# Patient Record
Sex: FEMALE | Race: WHITE | HISPANIC OR LATINO | URBAN - METROPOLITAN AREA
[De-identification: names, ages, dates, MRNs, and addresses within clinical notes are randomized per-mention and may not be internally consistent; named-entity substitution may affect disease eponyms.]

---

## 2020-09-11 ENCOUNTER — OFFICE VISIT (OUTPATIENT)
Dept: URBAN - METROPOLITAN AREA CLINIC 68 | Facility: CLINIC | Age: 50
End: 2020-09-11

## 2022-02-15 ENCOUNTER — OFFICE VISIT (OUTPATIENT)
Dept: URBAN - METROPOLITAN AREA CLINIC 68 | Facility: CLINIC | Age: 52
End: 2022-02-15

## 2022-06-04 ENCOUNTER — TELEPHONE ENCOUNTER (OUTPATIENT)
Dept: URBAN - METROPOLITAN AREA CLINIC 68 | Facility: CLINIC | Age: 52
End: 2022-06-04

## 2022-06-04 RX ORDER — AMOXICILLIN 500 MG/1
CAPSULE ORAL EVERY 12 HOURS
Qty: 56 | Refills: 0 | OUTPATIENT
Start: 2019-12-02 | End: 2019-12-16

## 2022-06-04 RX ORDER — CLARITHROMYCIN 500 MG/1
TABLET, FILM COATED ORAL EVERY 12 HOURS
Qty: 28 | Refills: 0 | OUTPATIENT
Start: 2019-12-02 | End: 2019-12-16

## 2022-06-04 RX ORDER — SODIUM SULFATE, POTASSIUM SULFATE, MAGNESIUM SULFATE 17.5; 3.13; 1.6 G/ML; G/ML; G/ML
SOLUTION, CONCENTRATE ORAL AS DIRECTED
Qty: 1 | Refills: 0 | OUTPATIENT
Start: 2019-10-23 | End: 2019-10-24

## 2022-06-04 RX ORDER — PANTOPRAZOLE SODIUM 40 MG/1
TABLET, DELAYED RELEASE ORAL EVERY 12 HOURS
Qty: 60 | Refills: 0 | OUTPATIENT
Start: 2019-12-02 | End: 2020-01-01

## 2022-06-05 ENCOUNTER — TELEPHONE ENCOUNTER (OUTPATIENT)
Dept: URBAN - METROPOLITAN AREA CLINIC 68 | Facility: CLINIC | Age: 52
End: 2022-06-05

## 2022-06-05 RX ORDER — LEVOTHYROXINE SODIUM 0.03 MG/1
LEVOTHYROXINE SODIUM( 25MCG ORAL   ) ACTIVE -HX ENTRY TABLET ORAL
Status: ACTIVE | COMMUNITY
Start: 2020-03-09

## 2022-06-05 RX ORDER — AMLODIPINE BESYLATE AND BENAZEPRIL HYDROCHLORIDE 2.5; 1 MG/1; MG/1
AMLODIPINE BESY-BENAZEPRIL HCL( 2.5-10MG ORAL   ) ACTIVE -HX ENTRY CAPSULE ORAL
Status: ACTIVE | COMMUNITY
Start: 2020-03-09

## 2022-06-25 ENCOUNTER — TELEPHONE ENCOUNTER (OUTPATIENT)
Age: 52
End: 2022-06-25

## 2022-06-25 RX ORDER — PANTOPRAZOLE 40 MG/1
TABLET, DELAYED RELEASE ORAL EVERY 12 HOURS
Qty: 60 | Refills: 0 | OUTPATIENT
Start: 2019-12-02 | End: 2020-01-01

## 2022-06-25 RX ORDER — SODIUM SULFATE, POTASSIUM SULFATE, MAGNESIUM SULFATE 17.5; 3.13; 1.6 G/ML; G/ML; G/ML
SOLUTION, CONCENTRATE ORAL AS DIRECTED
Qty: 1 | Refills: 0 | OUTPATIENT
Start: 2019-10-23 | End: 2019-10-24

## 2022-06-25 RX ORDER — AMOXICILLIN 500 MG/1
CAPSULE ORAL EVERY 12 HOURS
Qty: 56 | Refills: 0 | OUTPATIENT
Start: 2019-12-02 | End: 2019-12-16

## 2022-06-25 RX ORDER — CLARITHROMYCIN 500 MG/1
TABLET ORAL EVERY 12 HOURS
Qty: 28 | Refills: 0 | OUTPATIENT
Start: 2019-12-02 | End: 2019-12-16

## 2022-06-26 ENCOUNTER — TELEPHONE ENCOUNTER (OUTPATIENT)
Age: 52
End: 2022-06-26

## 2022-06-26 RX ORDER — LEVOTHYROXINE SODIUM 25 UG/1
LEVOTHYROXINE SODIUM( 25MCG ORAL   ) ACTIVE -HX ENTRY TABLET ORAL
Status: ACTIVE | COMMUNITY
Start: 2020-03-09

## 2022-06-26 RX ORDER — AMLODIPINE BESYLATE AND BENAZEPRIL HYDROCHLORIDE 2.5; 1 MG/1; MG/1
AMLODIPINE BESY-BENAZEPRIL HCL( 2.5-10MG ORAL   ) ACTIVE -HX ENTRY CAPSULE ORAL
Status: ACTIVE | COMMUNITY
Start: 2020-03-09

## 2024-02-09 ENCOUNTER — LAB (OUTPATIENT)
Dept: URBAN - METROPOLITAN AREA CLINIC 68 | Facility: CLINIC | Age: 54
End: 2024-02-09

## 2024-02-09 ENCOUNTER — OV EP (OUTPATIENT)
Dept: URBAN - METROPOLITAN AREA CLINIC 68 | Facility: CLINIC | Age: 54
End: 2024-02-09
Payer: COMMERCIAL

## 2024-02-09 VITALS
SYSTOLIC BLOOD PRESSURE: 124 MMHG | HEIGHT: 62 IN | BODY MASS INDEX: 31.28 KG/M2 | DIASTOLIC BLOOD PRESSURE: 80 MMHG | WEIGHT: 170 LBS

## 2024-02-09 DIAGNOSIS — Z98.890 H/O COLONOSCOPY WITH POLYPECTOMY: ICD-10-CM

## 2024-02-09 DIAGNOSIS — K76.0 HEPATIC STEATOSIS: ICD-10-CM

## 2024-02-09 DIAGNOSIS — K31.89 INTESTINAL METAPLASIA OF GASTRIC MUCOSA: ICD-10-CM

## 2024-02-09 PROCEDURE — 99204 OFFICE O/P NEW MOD 45 MIN: CPT | Performed by: INTERNAL MEDICINE

## 2024-02-09 RX ORDER — LEVOTHYROXINE SODIUM 25 UG/1
LEVOTHYROXINE SODIUM( 25MCG ORAL   ) ACTIVE -HX ENTRY TABLET ORAL
Status: ACTIVE | COMMUNITY
Start: 2020-03-09

## 2024-02-09 RX ORDER — SOD SULF/POT CHLORIDE/MAG SULF 1.479 G
12 TABLETS TABLET ORAL
Qty: 24 | Refills: 0 | OUTPATIENT
Start: 2024-02-09 | End: 2024-02-10

## 2024-02-09 NOTE — HPI-TODAY'S VISIT:
Case of a 53 YOF that comes in today for colonoscopy consult. Upon questioning she stated feeling well without acute complaints. Furthermore she denied episodes of neither bright red blood per rectum (hematochezia) nor black/tarry stools (melena). She also denied any unexplained significant weight loss, frequent abdominal pain/distention, nausea, vomits, early satiety, tenesmus, rectal pain and changes in bowel habits or stool caliber. She did not complain of episodes of diarrhea interspersed with constipation.

## 2024-02-16 ENCOUNTER — LAB (OUTPATIENT)
Dept: URBAN - METROPOLITAN AREA CLINIC 68 | Facility: CLINIC | Age: 54
End: 2024-02-16

## 2024-02-16 PROBLEM — 197321007: Status: ACTIVE | Noted: 2024-02-09

## 2024-02-16 LAB
A/G RATIO: 1.2
ALBUMIN: 4.2
ALKALINE PHOSPHATASE: 84
ALT (SGPT): 22
AST (SGOT): 18
BILIRUBIN, TOTAL: 0.8
BUN/CREATININE RATIO: (no result)
BUN: 13
CALCIUM: 9.7
CARBON DIOXIDE, TOTAL: 27
CHLORIDE: 105
CREATININE: 0.62
EGFR: 106
GLOBULIN, TOTAL: 3.4
GLUCOSE: 104
POTASSIUM: 4.4
PROTEIN, TOTAL: 7.6
SODIUM: 139

## 2024-03-12 ENCOUNTER — FIBROSCAN (OUTPATIENT)
Dept: URBAN - METROPOLITAN AREA CLINIC 67 | Facility: CLINIC | Age: 54
End: 2024-03-12

## 2024-03-14 ENCOUNTER — COL/EGD (OUTPATIENT)
Dept: URBAN - METROPOLITAN AREA SURGERY CENTER 12 | Facility: SURGERY CENTER | Age: 54
End: 2024-03-14